# Patient Record
Sex: MALE | Race: WHITE | Employment: OTHER | URBAN - METROPOLITAN AREA
[De-identification: names, ages, dates, MRNs, and addresses within clinical notes are randomized per-mention and may not be internally consistent; named-entity substitution may affect disease eponyms.]

---

## 2017-04-19 PROBLEM — E78.00 PURE HYPERCHOLESTEROLEMIA: Status: ACTIVE | Noted: 2017-04-19

## 2017-05-10 PROBLEM — I25.10 CORONARY ARTERY DISEASE INVOLVING NATIVE CORONARY ARTERY OF NATIVE HEART WITHOUT ANGINA PECTORIS: Status: ACTIVE | Noted: 2017-05-10

## 2017-06-14 PROBLEM — R42 DIZZINESS: Status: ACTIVE | Noted: 2017-06-14

## 2017-10-24 PROBLEM — I25.118 CORONARY ARTERY DISEASE OF NATIVE ARTERY OF NATIVE HEART WITH STABLE ANGINA PECTORIS (HCC): Status: ACTIVE | Noted: 2017-10-24

## 2017-10-24 PROBLEM — I25.10 CORONARY ARTERY DISEASE INVOLVING NATIVE CORONARY ARTERY OF NATIVE HEART WITHOUT ANGINA PECTORIS: Status: RESOLVED | Noted: 2017-05-10 | Resolved: 2017-10-24

## 2017-11-01 PROBLEM — I25.10 CORONARY ARTERY DISEASE INVOLVING NATIVE CORONARY ARTERY OF NATIVE HEART WITHOUT ANGINA PECTORIS: Status: ACTIVE | Noted: 2017-10-24

## 2018-02-14 ENCOUNTER — HOSPITAL ENCOUNTER (EMERGENCY)
Age: 68
Discharge: HOME | End: 2018-02-14
Attending: EMERGENCY MEDICINE
Payer: MEDICARE

## 2018-02-14 VITALS
HEIGHT: 67 IN | BODY MASS INDEX: 27.15 KG/M2 | OXYGEN SATURATION: 99 % | RESPIRATION RATE: 16 BRPM | WEIGHT: 173 LBS | SYSTOLIC BLOOD PRESSURE: 137 MMHG | HEART RATE: 77 BPM | DIASTOLIC BLOOD PRESSURE: 84 MMHG | TEMPERATURE: 97 F

## 2018-02-14 DIAGNOSIS — R04.0 EPISTAXIS: Primary | ICD-10-CM

## 2018-02-14 PROCEDURE — 99283 EMERGENCY DEPT VISIT LOW MDM: CPT

## 2018-02-14 PROCEDURE — 30903 CONTROL OF NOSEBLEED: CPT

## 2018-02-14 RX ORDER — AMOXICILLIN 875 MG/1
875 TABLET, COATED ORAL 2 TIMES DAILY
Qty: 10 TABLET | Refills: 0 | Status: SHIPPED | OUTPATIENT
Start: 2018-02-14 | End: 2018-02-19

## 2018-02-14 RX ORDER — METOPROLOL SUCCINATE 25 MG/1
25 TABLET, EXTENDED RELEASE ORAL DAILY
COMMUNITY
End: 2018-02-14

## 2018-02-14 NOTE — ED INITIAL ASSESSMENT (HPI)
Pt states yest had nosebleed from right nares that stopped after a couple hrs.   Today started again,

## 2018-02-14 NOTE — ED PROVIDER NOTES
Patient Seen in: [facility] [place]    History   Patient presents with:  Nose Bleed (nasopharyngeal)    Stated Complaint: nose bleed, on blood thinners onset yesterday but began again this am    HPI    Patient has noticed his blood Exam  General: The patient is awake, alert, conversant. Patient answers questions quickly and appropriately. Eyes: sclera white, conjunctiva pink and moist.  Lids and lashes are normal.  Nose: Some dried blood in the right nostril.   The nasal mucosa appe medications    amoxicillin 875 MG Oral Tab  Take 1 tablet (875 mg total) by mouth 2 (two) times daily.   Qty: 10 tablet Refills: 0

## 2018-08-21 PROBLEM — R14.2 BELCHING: Status: ACTIVE | Noted: 2018-08-21

## 2020-11-16 PROBLEM — R73.09 ABNORMAL GLUCOSE: Status: ACTIVE | Noted: 2020-11-16

## 2020-11-16 PROBLEM — R42 DIZZINESS: Status: RESOLVED | Noted: 2017-06-14 | Resolved: 2020-11-16

## 2020-11-16 PROBLEM — Z12.5 PROSTATE CANCER SCREENING: Status: ACTIVE | Noted: 2020-11-16

## 2020-11-16 PROBLEM — R42 LIGHTHEADEDNESS: Status: ACTIVE | Noted: 2020-11-16

## 2020-11-16 PROBLEM — R14.2 BELCHING: Status: RESOLVED | Noted: 2018-08-21 | Resolved: 2020-11-16

## 2020-11-16 PROBLEM — K63.5 POLYP OF COLON, UNSPECIFIED PART OF COLON, UNSPECIFIED TYPE: Status: ACTIVE | Noted: 2020-11-16

## 2020-12-03 PROBLEM — K59.00 CONSTIPATION, UNSPECIFIED CONSTIPATION TYPE: Status: ACTIVE | Noted: 2020-12-03

## 2021-12-23 PROBLEM — R42 LIGHTHEADEDNESS: Status: RESOLVED | Noted: 2020-11-16 | Resolved: 2021-12-23

## (undated) NOTE — ED AVS SNAPSHOT
Dillan Clark   MRN: [id number]    Department:  [facility] in [place]   Date of Visit:  2/14/2018           Disclosure     Insurance plans vary and the physician(s) referred by the ER may not be covered by your plan.  Please contact y tell this physician (or your personal doctor if your instructions are to return to your personal doctor) about any new or lasting problems. The primary care or specialist physician will see patients referred from the [facility] Emergency Department.  Lawrence Zhao